# Patient Record
Sex: MALE | Race: WHITE
[De-identification: names, ages, dates, MRNs, and addresses within clinical notes are randomized per-mention and may not be internally consistent; named-entity substitution may affect disease eponyms.]

---

## 2017-01-12 NOTE — RS.[email or web address]
Date of Note: 01/12/17


Visit #: 1


Date of Evaluation: 01/12/17


Surgery Performed?: Yes


Procedure Performed: 


Lumbar decompression


Date of Procedure: 11/18/16


Treatment Diagnosis: Core weakness


History of Condition/Mechanism of Injury:: Patient has had LBP for many years.  

He started falling and then was the doctor who schedule him for surgery.  His 

wife is present and states they were told the surgeon put in 2 coflex 

interlaminar stabilization units. The lumbar levels of this procedure are 

unknown.


Prior Level of Function.....Patient was independent with: ADL's, Self Care, Work

/Vocation, Caregiving, Ambulation/Mobility, Community Integration/Access


Functional Limitations: Sleep (No BLTs.  Lift no greater than 14#), Pushing, 

Pulling, Lifting, Carrying, Standing, Bending, Squatting


Current Subjective/complaints:: Patient states he has had no pain since surgery 

except in the bilateral sciatic notches.  The L > R.  He only hurts when he 

stands and it onsets in 5 minutes or less.


*Precautions: No lifting > 14# and no twisting.





Medical History


Medical History: Hypertension, Arthritis


Surgical History: CABG, Other


Smoking Status: Current some day smoker





Pain Assessment





- Pain Description


Pain Location: Bilateral sciatic notches


Pain Description: Aching


Pain Description: Intermittent and just when standing.


Current Pain Intensity: 0/10


Worst Pain Intensity: 4/10





Functional Outcome Measure


Oswestry LBP: 14 (28%)





- G Codes & Severity Modifier


G Codes & Modifier: Mobliltiy:  Current - CJ.  Goal - CI


Source of G Code score: Oswestry Low Back Pain Scale





General


Muscle Strength: BLE strength is 4/5 grossly.





- ROM


Lumbar Flexion: Hand reach to Mid-Thighs


Sidebending to Left: Reach to Mid-thigh


Sidebending to Right: Reach to Mid-thigh


Lumbar Spine ROM Limitations: Soft Tissue Tightness, Pain





- Special Tests


SLR Test: Negative Left, Negative Right


Seated Dural Stretch Test: Negative Left, Negative Right





- Left Knee ROM


Left Knee Extension: -20 degrees


Knee ROM Limitations: Soft Tissue Tightness





- Right Knee ROM


Right Knee Extension: -20 degrees


Knee ROM Limitations: Soft Tissue Tightness





Palpation


Palpation Findings: None/Normal





Sensation





- Sensation


Sensation Description: Within Normal Limits





Interventions





- Exercise/Activities/Manual Therapy


Exercises/Activities: NA


Manual Therapy: NA





- Charges


Total Direct Minutes: 45


Total Treatment Time: 45


Procedures billed for this date of service:: PT [place] (Mod)





Assessment


Assessment: Patient had lumbar decompressive surgery 11/18/2016.  He is doing 

well and no longer using a brace.  He now has limited lumbar and BLE ROM, 

decreased core and BLE strength and decreased endurance. He has only 

intermittent pain in the sciatic notch regions bilaterally.


Patient Education: Education of diagnosis, Body/Joint mechanics, Home Safety, 

Activity Modification, Education of Plan of Care


Rehab Potential: Good





Short Term Goals


Goal #1: Patient is independent with basic HEP.


Goal to be met by: 01/27/17


Goal #2: BLE strength 4+/5 throughout to improve stability.


Goal to be met by: 01/27/17


Goal #3: Patient reports being able to stand/walk up to 10 minutes w/o pain.


Goal to be met by: 01/27/17





Long Term Goals


Goal #1: Patient is able to walk up to 30 minutes w/o pain.


Goal to be met by: 02/10/17


Goal #2: Lumbar ROM WFLs


Goal to be met by: 02/10/17


Goal #3: Eliminate pain with daily activities.


Goal to be met by: 02/10/17


Goal #4: Independent with HEP for DC.


Goal to be met by: 02/10/17





Plan





- Treatment to be Provided


Procedures: Therapeutic Exercises, Therapeutic Activity, Gait Training, 

Neuromuscular Rehab, Manual Therapy, Massage, Patient Education


Modalities: Electrical Stimulation, Ultrasound/Phonophoresis, Class IV Laser, 

Cryotherapy, Hot Packs





- Treatment Plan


Frequency: 3 X week


Duration: 4 weeks


ORDER # VISITS AND/OR THROUGH DATE: 02/10/2017





- Treatment Code


(1) Weakness of trunk musculature


Comments: 


M62.81








(2) Decreased ROM of lumbar spine


Code(s): M25.60


Comments: 


M25.60

## 2017-01-16 NOTE — RS.OPPTDN
Subjective


Date of Note: 01/16/17


Visit #: 2


Date of Evaluation: 01/12/17


Treatment Diagnosis: Core weakness


Current Subjective/complaints:: Patient says he has not had much sleep.  

Reports his pain has been elevated lately and only to the L low back.


*Precautions: No lifting > 14# and no twisting.





Pain Assessment





- Pain Description


Pain Location: Bilateral sciatic notches


Pain Description: Aching


Pain Description: Intermittent and just when standing.


Current Pain Intensity: "more today"





- Treatment


Modality: Electrical Stim Unattended


Parameters/Method Applied: Hivolt 2 large pads L lumbar paraspinals @ 135 pk 

volts x 20 mins


Patient Position: Supine





- Heat/Cryotherapy


Treatment: Hot Pack





Interventions





- Exercise/Activities/Manual Therapy


Exercises/Activities: Patient received passive stretching of SKTC, HS, 

Piriformis, Trunk rotation (short range), heel cords, and Fig 4.  All x 4 reps 

bilaterally.  Patient education of all therex and benefits as well as diagnosis.


Total minutes of Exercise: 13


Manual Therapy: NA





- Charges


Total Direct Minutes: 13


Total Treatment Time: 33


Procedures billed for this date of service:: hp, estim (un), ex


Assessment: Patient c/o L lumbar pain and demonstrates mod muscle guarding to 

the area as well as swelling to the L of the incision.  This feels like a hard 

knot that did not produce pain when palpated.  He demo tightness with all 

therex and should benefit from further stretching and general LE strengthening.


Patient Education: Education of diagnosis, Body/Joint mechanics, Home Exercise 

Program, Home Safety, Activity Modification, Education of Plan of Care





Short Term Goals


Goal #1: Patient is independent with basic HEP.


Goal to be met by: 01/27/17


Goal #2: BLE strength 4+/5 throughout to improve stability.


Goal to be met by: 01/27/17


Goal #3: Patient reports being able to stand/walk up to 10 minutes w/o pain.


Goal to be met by: 01/27/17





Long Term Goals


Goal #1: Patient is able to walk up to 30 minutes w/o pain.


Goal to be met by: 02/10/17


Goal #2: Lumbar ROM WFLs


Goal to be met by: 02/10/17


Goal #3: Eliminate pain with daily activities.


Goal to be met by: 02/10/17


Goal #4: Independent with HEP for DC.


Goal to be met by: 02/10/17





Plan


PLAN OF CARE EXPIRES ON:: 02/10/17


ORDER # VISITS AND/OR THROUGH DATE: 02/10/2017


PLAN: Progress Exercises

## 2017-01-20 NOTE — RS.OPPTDN
Subjective


Date of Note: 01/20/17


Visit #: 3


Date of Evaluation: 01/12/17


Treatment Diagnosis: Core weakness


Current Subjective/complaints:: Patient says he is feeling better after 

cancelling his last session from being sick.  States that he did not seem to 

have any problems with performing exercises.


*Precautions: No lifting > 14# and no twisting.





Pain Assessment





- Pain Description


Pain Location: Bilateral sciatic notches


Pain Description: Aching


Pain Description: Intermittent and just when standing.


Current Pain Intensity: "more today"





- Treatment


Modality: Electrical Stim Unattended


Parameters/Method Applied: hivolt 4 large pads @ 145 pk volts x 20 mins


Treatment Area: bilateral lumbar paraspinals near incision


Patient Position: Supine





- Heat/Cryotherapy


Treatment: Hot Pack





Interventions





- Exercise/Activities/Manual Therapy


Exercises/Activities: Patient received passive stretching of SKTC, HS, 

Piriformis, Trunk rotation (short range), heel cords, and Fig 4.  All x 4 reps 

bilaterally.  Patient begins trunk stability and general LE strengthening of QS

, Pillow squeezes, isometric hip flexion and abd, bridging all x 10.


Total minutes of Exercise: 22


Manual Therapy: NA





- Charges


Total Direct Minutes: 22


Total Treatment Time: 42


Procedures billed for this date of service:: hp, estim (un), ex


Assessment: Patient admitting lowered pain level to the back and only appeared 

to have some difficulty agusto L HS stretching (due to tightness).  He would 

benefit from further stretching and general strengthening to the LE's.


Patient Education: Education of diagnosis, Body/Joint mechanics, Home Exercise 

Program, Home Safety, Activity Modification, Education of Plan of Care





Short Term Goals


Goal #1: Patient is independent with basic HEP.


Goal to be met by: 01/27/17


Progress towards Goal:: Progressing


Goal #2: BLE strength 4+/5 throughout to improve stability.


Goal to be met by: 01/27/17


Goal #3: Patient reports being able to stand/walk up to 10 minutes w/o pain.


Goal to be met by: 01/27/17





Long Term Goals


Goal #1: Patient is able to walk up to 30 minutes w/o pain.


Goal to be met by: 02/10/17


Goal #2: Lumbar ROM WFLs


Goal to be met by: 02/10/17


Goal #3: Eliminate pain with daily activities.


Goal to be met by: 02/10/17


Goal #4: Independent with HEP for DC.


Goal to be met by: 02/10/17





Plan


PLAN OF CARE EXPIRES ON:: 02/10/17


ORDER # VISITS AND/OR THROUGH DATE: 02/10/2017


PLAN: Continue Plan of Care (and progress exercises)

## 2017-01-25 NOTE — RS.OPPTDN
Subjective


Date of Note: 01/25/17


Visit #: 5


Date of Evaluation: 01/12/17


Treatment Diagnosis: Core weakness


Current Subjective/complaints:: Patient states that he is not hurting today.  

Reports exercises seem to be helping him.


*Precautions: No lifting > 14# and no twisting.





Pain Assessment





- Pain Description


Pain Location: Bilateral sciatic notches


Pain Description: Aching


Pain Description: Intermittent and just when standing.


Current Pain Intensity: "less than last time."





- Heat/Cryotherapy


Treatment: Hot Pack (15 mins to the mid to low back in supine)





Interventions





- Exercise/Activities/Manual Therapy


Exercises/Activities: Patient received passive stretching of SKTC, HS, 

Piriformis,  heel cords, and Fig 4.  All x 4 reps bilaterally.  Patient 

continues with trunk stability and general LE strengthening of QS, Pillow 

squeezes, isometric hip flexion and abd, bridging, SLR, alternate UE lift with 3

# bar and 2# on LE's all increased to 2 x 10.  Sitting:  Scap retraction with 

red tband x 10.  Gave patient SKTC, bridging, SLR, alternate LE lift and pillow 

squeezes for home.  Printed new copies.


Total minutes of Exercise: 30


Manual Therapy: NA





- Charges


Total Direct Minutes: 30


Total Treatment Time: 50


Procedures billed for this date of service:: hp, ex2


Assessment: Patient shows improved flexibility to LEs today with HS and heel 

cord stretching.  He does well with agusto all progressing trunk stability.


Patient Education: Education of diagnosis, Body/Joint mechanics, Home Exercise 

Program, Home Safety, Activity Modification, Education of Plan of Care


Patient demonstrates compliance with HEP?: Yes





Short Term Goals


Goal #1: Patient is independent with basic HEP.


Goal to be met by: 01/27/17


Progress towards Goal:: Progressing


Goal #2: BLE strength 4+/5 throughout to improve stability.


Goal to be met by: 01/27/17


Progress towards Goal:: Progressing


Goal #3: Patient reports being able to stand/walk up to 10 minutes w/o pain.


Goal to be met by: 01/27/17


Progress towards Goal:: Progressing





Long Term Goals


Goal #1: Patient is able to walk up to 30 minutes w/o pain.


Goal to be met by: 02/10/17


Goal #2: Lumbar ROM WFLs


Goal to be met by: 02/10/17


Progress towards goal: Progressing


Goal #3: Eliminate pain with daily activities.


Goal to be met by: 02/10/17


Progress towards goal: Progressing


Goal #4: Independent with HEP for DC.


Goal to be met by: 02/10/17





Plan


PLAN OF CARE EXPIRES ON:: 02/10/17


ORDER # VISITS AND/OR THROUGH DATE: 02/10/2017


PLAN: Progress Exercises

## 2017-01-27 NOTE — RS.OPPTDN
Subjective


Date of Note: 01/27/17


Visit #: 6


Date of Evaluation: 01/12/17


Treatment Diagnosis: Core weakness


Current Subjective/complaints:: Patient states he was leaned over for a while 

changing his car battery yesterday and has had soreness since across the low 

back and c/o L hip pain.  He says he thought he should be able to do this well 

and without any problems.


*Precautions: No lifting > 14# and no twisting.





Pain Assessment





- Pain Description


Pain Location: Bilateral sciatic notches


Pain Description: Aching


Pain Description: Intermittent and just when standing.


Current Pain Intensity: "less than last time."





Interventions





- Exercise/Activities/Manual Therapy


Exercises/Activities: Patient received passive stretching of SKTC, HS, 

Piriformis,  heel cords, and Fig 4.  All x 4 reps bilaterally.  Patient 

continues with trunk stability and general LE strengthening of QS, Pillow 

squeezes, isometric hip flexion and abd, bridging, SLR,   Sitting:  Scap 

retraction with red tband x 10.  SAQ 2# each leg, QS, DF with green tband.  All 

2x 10 reps.


Total minutes of Exercise: 30


Manual Therapy: NA


HOME EXERCISE PROGRAM: SKTC, Pillow squeezes, bridging, alternate LE lift.  

Patient asks about using OTC tens unit for his back.  Patient was encouraged to 

use it and instructed where to place it.





- Charges


Total Direct Minutes: 30


Total Treatment Time: 50


Procedures billed for this date of service:: ex2, hp


Assessment: Patient has had increased pain to the L hip and low back near the 

waistline.  He was leaning over changing his car battery and did not anticipate 

it bothering him as much as it had.


Patient Education: Education of diagnosis, Body/Joint mechanics, Home Exercise 

Program, Home Safety, Activity Modification, Education of Plan of Care


Patient demonstrates compliance with HEP?: Yes





Short Term Goals


Goal #1: Patient is independent with basic HEP.


Goal to be met by: 01/27/17


Progress towards Goal:: Progressing


Goal #2: BLE strength 4+/5 throughout to improve stability.


Goal to be met by: 01/27/17


Progress towards Goal:: Progressing


Goal #3: Patient reports being able to stand/walk up to 10 minutes w/o pain.


Goal to be met by: 01/27/17


Progress towards Goal:: Progressing





Long Term Goals


Goal #1: Patient is able to walk up to 30 minutes w/o pain.


Goal to be met by: 02/10/17


Goal #2: Lumbar ROM WFLs


Goal to be met by: 02/10/17


Progress towards goal: Progressing


Goal #3: Eliminate pain with daily activities.


Goal to be met by: 02/10/17


Progress towards goal: Progressing


Goal #4: Independent with HEP for DC.


Goal to be met by: 02/10/17





Plan


PLAN OF CARE EXPIRES ON:: 02/10/17


ORDER # VISITS AND/OR THROUGH DATE: 02/10/2017


PLAN: Progress Exercises

## 2017-01-31 ENCOUNTER — HOSPITAL ENCOUNTER (OUTPATIENT)
Dept: HOSPITAL 58 - REHAB | Age: 72
End: 2017-01-31
Attending: ORTHOPAEDIC SURGERY

## 2017-01-31 DIAGNOSIS — M62.81: ICD-10-CM

## 2017-01-31 DIAGNOSIS — M54.5: Primary | ICD-10-CM

## 2017-01-31 DIAGNOSIS — M25.60: ICD-10-CM

## 2017-01-31 NOTE — RS.OPPTDN
Subjective


Date of Note: 01/31/17


Visit #: 8


Date of Evaluation: 01/12/17


Treatment Diagnosis: Core weakness


Current Subjective/complaints:: Patient admits he is doing well.  He says he 

has not had much pain.  He says soreness is mostly L side to his hip.  States 

no problems agusto exercises and feels he has more mobility.


*Precautions: No lifting > 14# and no twisting.





Pain Assessment





- Pain Description


Pain Location: Bilateral sciatic notches


Pain Description: Aching


Pain Description: Intermittent and just when standing.


Current Pain Intensity: "less than last time."





Interventions





- Exercise/Activities/Manual Therapy


Exercises/Activities: Patient received passive stretching of SKTC, HS, 

Piriformis,  heel cords, and Fig 4.  All x 3 reps bilaterally.  Patient 

continues with trunk stability and general LE strengthening of QS, Ball squeezes

, isometric hip flexion and abd, Hooklying hip abd with green tband, bridging, 

SLR, alternate LE raise with 2# therapy stick for UE, SAQ 2#, ham curls/DF with 

green tband all 2/10.   Sitting:  Scap retraction progressed to green tband x 

15.  SAQ 3# each leg, QS, DF with green tband.  All 2x 10 reps.  Began 

treadmill @ 1.1 mph x 7 mins steadily.


Total minutes of Exercise: 38


Manual Therapy: NA


HOME EXERCISE PROGRAM: SKTC, Pillow squeezes, bridging, alternate LE lift.  

Patient asks about using OTC tens unit for his back.  Patient was encouraged to 

use it and instructed where to place it.





- Charges


Total Direct Minutes: 38


Total Treatment Time: 38


Procedures billed for this date of service:: ex3


Assessment: Patient with less soreness to the LB and demo improved ability to 

perform progressive therex.  He is working well with treadmill up to 7 mins 

today demo good balance.


Patient Education: Education of diagnosis, Body/Joint mechanics, Home Exercise 

Program, Home Safety, Activity Modification, Education of Plan of Care


Patient demonstrates compliance with HEP?: Yes





Short Term Goals


Goal #1: Patient is independent with basic HEP.


Goal to be met by: 01/27/17


Progress towards Goal:: Met


Goal #2: BLE strength 4+/5 throughout to improve stability.


Goal to be met by: 01/27/17


Progress towards Goal:: Met


Goal #3: Patient reports being able to stand/walk up to 10 minutes w/o pain.


Goal to be met by: 01/27/17


Progress towards Goal:: Progressing





Long Term Goals


Goal #1: Patient is able to walk up to 30 minutes w/o pain.


Goal to be met by: 02/10/17


Goal #2: Lumbar ROM WFLs


Goal to be met by: 02/10/17


Progress towards goal: Progressing


Goal #3: Eliminate pain with daily activities.


Goal to be met by: 02/10/17


Progress towards goal: Progressing


Goal #4: Independent with HEP for DC.


Goal to be met by: 02/10/17





Plan


PLAN OF CARE EXPIRES ON:: 02/10/17


ORDER # VISITS AND/OR THROUGH DATE: 02/10/2017


PLAN: Progress Exercises

## 2017-02-03 ENCOUNTER — HOSPITAL ENCOUNTER (OUTPATIENT)
Dept: HOSPITAL 58 - REHAB | Age: 72
LOS: 25 days | End: 2017-02-28
Attending: ORTHOPAEDIC SURGERY

## 2017-02-03 DIAGNOSIS — Z98.890: ICD-10-CM

## 2017-02-03 DIAGNOSIS — M54.5: Primary | ICD-10-CM

## 2017-02-03 NOTE — RS.OPPTDN
Subjective


Date of Note: 02/03/17


Visit #: 9


Date of Evaluation: 01/12/17


Treatment Diagnosis: Core weakness


Current Subjective/complaints:: Patient states he changed another battery for 

his daughter yesterday and says that his back was hurt again.  Reports that he 

had tightness/soreness to the L calf.  Reports he was leaning over and on balls 

of feet.  He says he used Biofreeze on his leg and it had relieved his pain.


*Precautions: No lifting > 14# and no twisting.





Pain Assessment





- Pain Description


Pain Location: L calf and waistline


Pain Description: elevated due to poor positioning changing battery





- Heat/Cryotherapy


Treatment: Hot Pack (mid to low back in supine x 15 mins)





Interventions





- Exercise/Activities/Manual Therapy


Exercises/Activities: Patient received passive stretching of SKTC, HS, 

Piriformis,  heel cords, and Fig 4.  All x 3 reps bilaterally.  Patient 

continues with trunk stability and general LE strengthening of QS, Ball squeezes

, isometric hip flexion and abd, Hooklying hip abd with green tband, bridging, 

SLR, alternate LE raise with 2# therapy stick for UE, SAQ 2 1/2#, ham curls/DF 

with green tband all 2/10.   Sitting:  Scap retraction progressed to green 

tband x 15.  SAQ 3# each leg, QS, DF with green tband.  All 2x 10 reps.  No 

treadmill today per patient admitting he will leave here and grocery shop at 

[facility] and will do plenty of walking.


Total minutes of Exercise: 35


Manual Therapy: NA


HOME EXERCISE PROGRAM: SKTC, Pillow squeezes, bridging, alternate LE lift.  

Patient asks about using OTC tens unit for his back.  Patient was encouraged to 

use it and instructed where to place it.





- Charges


Total Direct Minutes: 35


Total Treatment Time: 50


Procedures billed for this date of service:: hp, ex2


Assessment: Patient had elevated pain and soreness to the L calf.  He had full 

relief with moist heat, Biofreeze (at home yesterday), and stretching today.  

Recent increase related to poor mechanics changing another battery.


Patient Education: Education of diagnosis, Body/Joint mechanics, Home Exercise 

Program, Home Safety, Activity Modification, Education of Plan of Care


Patient demonstrates compliance with HEP?: Yes





Short Term Goals


Goal #1: Patient is independent with basic HEP.


Goal to be met by: 01/27/17


Progress towards Goal:: Met


Goal #2: BLE strength 4+/5 throughout to improve stability.


Goal to be met by: 01/27/17


Progress towards Goal:: Met


Goal #3: Patient reports being able to stand/walk up to 10 minutes w/o pain.


Goal to be met by: 01/27/17


Progress towards Goal:: Progressing





Long Term Goals


Goal #1: Patient is able to walk up to 30 minutes w/o pain.


Goal to be met by: 02/10/17


Goal #2: Lumbar ROM WFLs


Goal to be met by: 02/10/17


Progress towards goal: Progressing


Goal #3: Eliminate pain with daily activities.


Goal to be met by: 02/10/17


Progress towards goal: Progressing


Goal #4: Independent with HEP for DC.


Goal to be met by: 02/10/17





Plan


PLAN OF CARE EXPIRES ON:: 02/10/17


ORDER # VISITS AND/OR THROUGH DATE: 02/10/2017


PLAN: Plan for Discharge (Attend one more session next week)

## 2017-08-16 NOTE — RS.[email or web address]
Date of Note: 08/15/17


Visit #: 1


Date of Evaluation: 08/15/17


Payer Source: MEDICARE


Surgery Performed?: Yes


Treatment Diagnosis: Left low back/hip pain


History of Condition/Mechanism of Injury:: Patient reports having surgery to 

his low back in November 2016.  States he feels like he did well after surgery. 

He attended Outpatient Physical Therapy following his surgery.  States this 

left sided low back/LE pain started at least two months ago, without any known 

injury.


Prior Level of Function.....Patient was independent with: ADL's, Self Care, Work

/Vocation, Caregiving, Ambulation/Mobility, Community Integration/Access


Functional Limitations: Sleep, Pushing, Pulling, Lifting, Carrying, Standing, 

Bending, Squatting


Current Subjective/complaints:: Patient reports pain is in left buttock area, 

hip, and down to the knee.  States pain is better than it was when it first 

started.  States his pain is worse in standing.  He is still having difficulty 

sleeping, but he is sleeping better than when his symptoms for started.  He has 

pain medication to take, but states he only takes it when his pain is severe.  

Last pain medication taken was two nights ago.  Reports he has noticed some 

weakness in the left LE. Reports currently having Bell's Palsy symptoms from 

having a lump removed from his neck last month.  Patient reports increased left 

LE pain following evaluation.





Medical History


Medical History: Hypertension, Arthritis


Surgical History: CABG (2004), Other (lump removed from neck 7/3/17)


Surgical History Comments:: Lumbar spine surgery involving 2 coflex 

interlaminar stabilization units, 11/18/16


Smoking Status: Current every day smoker


Hx Home Medications: Norco PRN, Toprol XL, Lipitor, Zestril


Patient's Goals: His goal is to get relief of left LE and back pain.





Pain Assessment





- Pain Description


Pain Location: Pain in left low back and LE.


Pain Description: Radiating, Aching


Current Pain Intensity: 5/10


Worst Pain Intensity: not quantified





Functional Outcome Measure


Oswestry LBP: 66





- G Codes & Severity Modifier


G Codes & Modifier: Mob current CL.  Mob goal CJ


Source of G Code score: Oswestry LBP scale





Observation





- Observation


Posture: Forward Head, Rounded Shoulders, Decreased Lumbar Lordosis





Gait





- Gait Pattern


Gait Comments: Ambulates without an assistive device.  No obvious gait 

deviations noted.





- ROM


Lumbar Flexion: Hand reach to patellae


Sidebending to Left: Reach to Mid-thigh


Sidebending to Right: Reach to Mid-thigh


Lumbar Spine ROM Limitations: Pain


Comments: Reports lumbar extension past neutral reproduces left LE symptoms.





- Strength


Comments: Gross trunk muscle strength 4/5.  Left LE strength: hip 4/5, knee 4+/5

, ankle 4 to 4+/5.  Right LE strength grossly 4 to 4+/5.





- Special Tests


THO Test: Negative Left, Negative Right


SLR Test: Negative Left, Negative Right


Seated Dural Stretch Test: Negative Left, Negative Right


SI Joint Compression: Negative


SI Joint Distraction: Negative





Palpation


Comments:: Patient reports some tenderness along the left lumbosacral region.  

Demonstrates moderate increased muscle tone along bilateral lumbar paraspinals.

  Reports no tenderness with palpation to the gluteal musculature of the left 

buttock region.





Sensation





- Sensation


Comments: 


Reports sensation is intact.





Additional Comments:


Additional Comments: SLR in supine, right to 45 degrees, left to 35-40 degrees.





- Treatment


Modality: Electrical Stim Unattended


Parameters/Method Applied: 2 large pads to left lumbosacral region X 10 mins @ 

40 peak volts HVGS


Patient Position: Sitting





- Heat/Cryotherapy


Treatment: Hot Pack (with Estim to low back)





Interventions





- Exercise/Activities/Manual Therapy


Exercises/Activities: Patient instructed in gentle HS stretch for home.


Manual Therapy: NA


HOME EXERCISE PROGRAM: HS stretch





- Charges


Total Direct Minutes: 55 mins


Total Treatment Time: 45 mins 


Procedures billed for this date of service:: Phuc Lua





Assessment


Assessment: Patient presents to therapy with a diagnosis of sciatica of left 

side.  He reports left sided low back pain with left LE pain for at least two 

months, without any known injury.  Reports limited tolerance with standing.  He 

demonstrates general weakness in the left LE and trunk.  Pain is only 

reproduced with lumbar extension.  Demonstrates no reproduction of symptoms 

with Special Tests.  Patient will benefit from modalities and stretching to 

reduce muscle tone and pain, and progressed exercises to improve LE and trunk 

strength.


Patient Education: Education of diagnosis, Body/Joint mechanics, Home Exercise 

Program, Education of Plan of Care


Rehab Potential: Good





Short Term Goals


Goal #1: Patient is independent with basic HEP.


Goal to be met by: 08/30/17


Goal #2: BLE strength 4+/5 throughout.


Goal to be met by: 08/30/17


Goal #3: Patient reports being able to stand up to 10 minutes with minmal pain.


Goal to be met by: 08/30/17





Long Term Goals


Goal #1: Pt knows HEP and to continue ex's to maintain functional ability.


Goal to be met by: 09/30/17


Goal #2: Score on Oswestry LBP scale improved to 40.


Goal to be met by: 09/30/17


Goal #3: Pt able to stand as needed with minimal to no back/LLE pain.


Goal to be met by: 09/30/17


Goal #4: Patient able to perform all daily activities with minimal to no pain.


Goal to be met by: 09/30/17





Plan





- Treatment to be Provided


Procedures: Therapeutic Exercises, Therapeutic Activity, Gait Training, 

Neuromuscular Rehab, Manual Therapy, Massage, Patient Education


Modalities: Electrical Stimulation, Ultrasound/Phonophoresis, Class IV Laser, 

Cryotherapy, Hot Packs





- Treatment Plan


Frequency: 3 X week


Duration: 4 weeks


ORDER # VISITS AND/OR THROUGH DATE: 9/30/17





- Treatment Code


(1) Leg pain


Qualifiers: 


     Laterality: left     Qualified Description: Pain of left lower extremity  

     Qualifier Code(s): (M79.605) Pain in left leg  





(2) Weakness of trunk musculature


Comments: 


M62.81








(3) Left sided sciatica


Comments: 


M54.32

## 2017-08-21 NOTE — RS.OPPTDN
Subjective


Date of Note: 08/21/17


Visit #: 2


Date of Evaluation: 08/15/17


Payer Source: MEDICARE


Treatment Diagnosis: Left low back/hip pain


Current Subjective/complaints:: Patient reports moderate pain today ,has taken 

pain meds. before PT appt.He reports standing causes him the most pain.


*Precautions: No lifting > 14# and no twisting.





Pain Assessment





- Pain Description


Pain Location: Pain in left low back and LE.


Pain Description: Radiating, Aching


Current Pain Intensity: 5/10


Other Comments regarding Pain:: Radiating pain is down to the L knee today.





- Treatment


Modality: Electrical Stim Unattended (Requests no hot pack today.)


Parameters/Method Applied: 20 mins. high volt to L hip and lumbar area,2 large 

electrodes @ 125 pv.


Patient Position: Right Sidelying (Requests no hot pack today.)





Interventions





- Exercise/Activities/Manual Therapy


Exercises/Activities: 25 mins. of hamstring stretches with towel assist if 

necessary,SKTC in pain free ROM,body mechanics.


Total minutes of Exercise: 25


Manual Therapy: NA


Total minutes of Manual Therapy: 0


HOME EXERCISE PROGRAM: HS stretch





- Charges


Total Direct Minutes: 25


Total Treatment Time: 45


Procedures billed for this date of service:: e-stim,ex 2


Assessment: Patient reports he can tolerate being supine for short time today 

with less pain and sciatica , and this improves in hooklying position.He has 

moderate hamstring tightness in the L LE.He is attentive to HEP recommendations.


Patient Education: Education of diagnosis, Body/Joint mechanics, Home Exercise 

Program, Home Safety, Activity Modification, Education of Plan of Care


Patient demonstrates compliance with HEP?: Yes





Short Term Goals


Goal #1: Patient is independent with basic HEP.


Goal to be met by: 08/30/17


Progress towards Goal:: Progressing


Goal #2: BLE strength 4+/5 throughout.


Goal to be met by: 08/30/17


Goal #3: Patient reports being able to stand up to 10 minutes with minimal pain.


Goal to be met by: 08/30/17





Long Term Goals


Goal #1: Pt knows HEP and to continue ex's to maintain functional ability.


Goal to be met by: 09/30/17


Progress towards goal: Progressing


Goal #2: Score on Oswestry LBP scale improved to 40.


Goal to be met by: 09/30/17


Goal #3: Pt able to stand as needed with minimal to no back/LLE pain.


Goal to be met by: 09/30/17


Goal #4: Patient able to perform all daily activities with minimal to no pain.


Goal to be met by: 09/30/17





Plan


PLAN OF CARE EXPIRES ON:: 09/30/17


ORDER # VISITS AND/OR THROUGH DATE: 9/30/17


PLAN: Continue Plan of Care

## 2017-08-28 NOTE — RS.OPPTDN
Subjective


Date of Note: 08/28/17


Visit #: 3


Date of Evaluation: 08/15/17


Payer Source: MEDICARE


Treatment Diagnosis: Left low back/hip pain


Current Subjective/complaints:: Patient reports no pain at rest today,but it 

returns as he walks and increases the longer he walks.


*Precautions: No lifting > 14# and no twisting.





Pain Assessment





- Pain Description


Pain Location: Pain in left low back and LE.


Pain Description: Radiating, Aching


Current Pain Intensity: 5/10





- Treatment


Modality: Electrical Stim Unattended


Parameters/Method Applied: 20 mins. high volt,2 large electrodes to L lumbar 

and L gluteal region @ 140 pv,


Patient Position: Right Sidelying





Interventions





- Exercise/Activities/Manual Therapy


Exercises/Activities: 20 mins. of hamstring stretches ,SKTC ,body mechanics 

review.


Manual Therapy: NA


HOME EXERCISE PROGRAM: HS stretch





- Charges


Total Direct Minutes: 20


Total Treatment Time: 40


Procedures billed for this date of service:: e-stim,ex 1


Assessment: Patient initially has moderate hamstring tightness L > R ,but this 

improves as stretches progress today.He tolerates the SKTC stretches with no 

increased back pain .He does report slight L hip pain as the exercises continue 

while doing hamstring stretches.


Patient Education: Body/Joint mechanics, Home Exercise Program, Activity 

Modification, Education of Plan of Care





Short Term Goals


Goal #1: Patient is independent with basic HEP.


Goal to be met by: 08/30/17


Progress towards Goal:: Progressing


Goal #2: BLE strength 4+/5 throughout.


Goal to be met by: 08/30/17


Goal #3: Patient reports being able to stand up to 10 minutes with minimal pain.


Goal to be met by: 08/30/17


Progress towards Goal:: No Change





Long Term Goals


Goal #1: Pt knows HEP and to continue ex's to maintain functional ability.


Goal to be met by: 09/30/17


Progress towards goal: Progressing


Goal #2: Score on Oswestry LBP scale improved to 40.


Goal to be met by: 09/30/17


Goal #3: Pt able to stand as needed with minimal to no back/LLE pain.


Goal to be met by: 09/30/17


Goal #4: Patient able to perform all daily activities with minimal to no pain.


Goal to be met by: 09/30/17





Plan


PLAN OF CARE EXPIRES ON:: 09/30/17


ORDER # VISITS AND/OR THROUGH DATE: 9/30/17


PLAN: Continue Plan of Care

## 2017-08-30 ENCOUNTER — HOSPITAL ENCOUNTER (OUTPATIENT)
Dept: HOSPITAL 58 - REHAB | Age: 72
LOS: 1 days | End: 2017-08-31
Attending: FAMILY MEDICINE

## 2017-08-30 DIAGNOSIS — M54.32: Primary | ICD-10-CM

## 2017-08-30 NOTE — RS.OPPTDN
Subjective


Date of Note: 08/30/17


Visit #: 4


Date of Evaluation: 08/15/17


Payer Source: MEDICARE


Treatment Diagnosis: Left low back/hip pain


Current Subjective/complaints:: Patient says he hurt his back worse this 

morning.  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  He 

says he landed on the L side of his back and hip.


*Precautions: No lifting > 14# and no twisting.





Pain Assessment





- Pain Description


Pain Location: Pain in left low back and LE.


Pain Description: Radiating, Aching


Current Pain Intensity: Increased





- Treatment


Modality: Electrical Stim Unattended


Parameters/Method Applied: hivolt 4 large pads controlled seperately from L to 

R @ 160-175 pk volts x 20 mins


Treatment Area: lumbar paraspinals and into SI joints


Patient Position: Right Sidelying





Interventions





- Exercise/Activities/Manual Therapy


Exercises/Activities: Patient receives passive stretching to the L: SKTC, HS, 

heel cord stretching, Piriformis, fig 4, and lower trunk rotation to the R.  

Patient remains very tight and only minimal stretch required to obtain results.

  Patient instructed in supine and standing gastroc stretching for home per 

request.


Total minutes of Exercise: 20


Manual Therapy: NA


HOME EXERCISE PROGRAM: HS stretch





- Charges


Total Direct Minutes: 20


Total Treatment Time: 40


Procedures billed for this date of service:: hp, estim (un), ex


Assessment: Patient with recent reports of pain related to fall.  Patient lands 

on the L back and hip.  He presents with elevated pain, but at end of session, 

he admits improved pain and tightness to the L LB.  He also shows improved gait 

as he is placing more WB on the L LE compared to arrival.


Patient Education: Education of diagnosis, Body/Joint mechanics, Home Exercise 

Program, Home Safety, Activity Modification, Education of Plan of Care


Patient demonstrates compliance with HEP?: Yes





Short Term Goals


Goal #1: Patient is independent with basic HEP.


Goal to be met by: 08/30/17


Progress towards Goal:: Progressing


Goal #2: BLE strength 4+/5 throughout.


Goal to be met by: 08/30/17


Goal #3: Patient reports being able to stand up to 10 minutes with minimal pain.


Goal to be met by: 08/30/17


Progress towards Goal:: No Change





Long Term Goals


Goal #1: Pt knows HEP and to continue ex's to maintain functional ability.


Goal to be met by: 09/30/17


Progress towards goal: Progressing


Goal #2: Score on Oswestry LBP scale improved to 40.


Goal to be met by: 09/30/17


Goal #3: Pt able to stand as needed with minimal to no back/LLE pain.


Goal to be met by: 09/30/17


Goal #4: Patient able to perform all daily activities with minimal to no pain.


Goal to be met by: 09/30/17





Plan


PLAN OF CARE EXPIRES ON:: 09/30/17


ORDER # VISITS AND/OR THROUGH DATE: 9/30/17


PLAN: Progress Exercises (continue modalities for pain relief and muscle 

tightness along with progressive stretching leading to stability exercises.)

## 2017-09-05 NOTE — RS.OPPTDN
Subjective


Date of Note: 09/05/17


Visit #: 5


Date of Evaluation: 08/15/17


Payer Source: MEDICARE


Treatment Diagnosis: Left low back/hip pain


Current Subjective/complaints:: Reports the back feels better than last week,

has recovered from being knocked down by one of his cows.He requests not to use 

the hot pack today.


*Precautions: No lifting > 14# and no twisting.





Pain Assessment





- Pain Description


Pain Location: Pain in left low back and LE.


Pain Description: Radiating, Aching


Pain Description: radiates into L gluteals


Current Pain Intensity: 2-3/10





- Treatment


Modality: Electrical Stim Unattended


Parameters/Method Applied: 20 mis. high volt to L lumbar and L hip ,2 large 

electrodes @ 175 pv.


Patient Position: Right Sidelying





Interventions





- Exercise/Activities/Manual Therapy


Exercises/Activities: 25 mins. total of SKTC,DKTC,piriformis stretches.90 / 90 

hamstring stretches for L and R today,ended with body mechanics education.


Total minutes of Exercise: 25


Manual Therapy: NA


Total minutes of Manual Therapy: 0


HOME EXERCISE PROGRAM: SKTC,hamstring stretches,gentle piriformis stretches.





- Charges


Total Direct Minutes: 25


Total Treatment Time: 45


Procedures billed for this date of service:: e-stim,ex 2


Assessment: Patient reports relief after PT sessions.he is able to tolerate 

standing position for increased duration with less pain present the past few 

days.He is attentive and motivated to improve.His hamstrings have moderate 

tightness bilaterally,which lessens after the stretching.


Patient Education: Education of diagnosis, Body/Joint mechanics, Home Exercise 

Program, Home Safety, Activity Modification, Education of Plan of Care


Patient demonstrates compliance with HEP?: Yes





Short Term Goals


Goal #1: Patient is independent with basic HEP.


Goal to be met by: 08/30/17


Progress towards Goal:: Progressing


Goal #2: BLE strength 4+/5 throughout.


Goal to be met by: 08/30/17


Goal #3: Patient reports being able to stand up to 10 minutes with minimal pain.


Goal to be met by: 08/30/17


Progress towards Goal:: Progressing





Long Term Goals


Goal #1: Pt knows HEP and to continue ex's to maintain functional ability.


Goal to be met by: 09/30/17


Progress towards goal: Progressing


Goal #2: Score on Oswestry LBP scale improved to 40.


Goal to be met by: 09/30/17


Goal #3: Pt able to stand as needed with minimal to no back/LLE pain.


Goal to be met by: 09/30/17


Goal #4: Patient able to perform all daily activities with minimal to no pain.


Goal to be met by: 09/30/17


Progress towards goal: Progressing





Plan


PLAN OF CARE EXPIRES ON:: 09/30/17


ORDER # VISITS AND/OR THROUGH DATE: 9/30/17


PLAN: Continue Plan of Care

## 2017-09-11 ENCOUNTER — HOSPITAL ENCOUNTER (OUTPATIENT)
Dept: HOSPITAL 58 - REHAB | Age: 72
LOS: 19 days | End: 2017-09-30
Attending: FAMILY MEDICINE

## 2017-09-11 DIAGNOSIS — M54.32: Primary | ICD-10-CM

## 2017-09-11 NOTE — RS.OPPTDN
Subjective


Date of Note: 09/11/17


Visit #: 6


Date of Evaluation: 08/15/17


Payer Source: MEDICARE


Treatment Diagnosis: Left low back/hip pain


Current Subjective/complaints:: Patient reports he is doing his exercises as 

time allows,feels the therapy is helping.He currently has no pain.


*Precautions: No lifting > 14# and no twisting.





Pain Assessment





- Pain Description


Pain Location: L lumbar/gluteals


Pain Description: Dull, Aching


Current Pain Intensity: 0 at rest





- Treatment


Modality: Electrical Stim Unattended


Parameters/Method Applied: 20 mins. high volt tolumbar,2 large electrodes,@ 205 

pv.


Patient Position: Right Sidelying





Interventions





- Exercise/Activities/Manual Therapy


Exercises/Activities: 25 mins. total of SKTC,DKTC,piriformis stretches.90 / 90 

hamstring stretches .Added postural pullbacks with yellow theraband at 

different heights .


Total minutes of Exercise: 25


Manual Therapy: NA


Total minutes of Manual Therapy: 0


HOME EXERCISE PROGRAM: SKTC,hamstring stretches,gentle piriformis 

stretches.Yellow theraband postural pullbacks.





- Charges


Total Direct Minutes: 25


Total Treatment Time: 45


Procedures billed for this date of service:: e-stim,ex 2


Assessment: Patient is progressing.He reports tolerating ADl's with less 

intense pain ,and also the pain is less frequent.He does reports static stance 

increases his pain ,usually within 2 - 3 minutes.He gets relief from this pain 

with walking or any change of position.He has improved posture awareness,

motivated and attentive.


Patient Education: Education of diagnosis, Body/Joint mechanics, Home Exercise 

Program, Home Safety, Activity Modification, Education of Plan of Care


Patient demonstrates compliance with HEP?: Yes





Short Term Goals


Goal #1: Patient is independent with basic HEP.


Goal to be met by: 08/30/17


Progress towards Goal:: Progressing


Goal #2: BLE strength 4+/5 throughout.


Goal to be met by: 08/30/17


Progress towards Goal:: Progressing


Goal #3: Patient reports being able to stand up to 10 minutes with minimal pain.


Goal to be met by: 08/30/17 (40%)


Progress towards Goal:: Progressing





Long Term Goals


Goal #1: Pt knows HEP and to continue ex's to maintain functional ability.


Goal to be met by: 09/30/17


Progress towards goal: Progressing


Goal #2: Score on Oswestry LBP scale improved to 40.


Goal to be met by: 09/30/17


Goal #3: Pt able to stand as needed with minimal to no back/LLE pain.


Goal to be met by: 09/30/17


Goal #4: Patient able to perform all daily activities with minimal to no pain.


Goal to be met by: 09/30/17


Progress towards goal: Progressing





Plan


PLAN OF CARE EXPIRES ON:: 09/30/17


ORDER # VISITS AND/OR THROUGH DATE: 9/30/17


PLAN: Progress Exercises

## 2017-09-13 NOTE — RS.CXNS
Date of scheduled appointment: 09/13/17


Type: No Show (Unknown,but possibly due to work schedule.)

## 2018-09-03 ENCOUNTER — HOSPITAL ENCOUNTER (OUTPATIENT)
Dept: HOSPITAL 58 - AMBL | Age: 73
Discharge: HOME | End: 2018-09-03
Attending: INTERNAL MEDICINE
Payer: COMMERCIAL

## 2018-09-03 DIAGNOSIS — R53.1: ICD-10-CM

## 2018-09-03 DIAGNOSIS — R40.20: Primary | ICD-10-CM

## 2018-09-03 DIAGNOSIS — R41.0: ICD-10-CM

## 2018-09-03 DIAGNOSIS — R11.0: ICD-10-CM

## 2018-09-03 DIAGNOSIS — I95.9: ICD-10-CM

## 2018-09-03 DIAGNOSIS — R00.1: ICD-10-CM

## 2018-09-03 DIAGNOSIS — R51: ICD-10-CM

## 2021-01-13 NOTE — RS.OPPTDN
Pt alert and orientedx4. 4 liter of oxygen, nasal cannula, states she takes 4 liters PRN at night at home. ASHLEY but refuses CPAP, just states the nasal cannula works. Pulse ox. Lung sounds diminished. Hx. Of fall back in July 2020. No order for tele.  On hep Subjective


Date of Note: 01/30/17


Visit #: 7


Date of Evaluation: 01/12/17


Treatment Diagnosis: Core weakness


Current Subjective/complaints:: Patient says he did not have any soreness after 

his last session with new therex.  He says he is doing good today.  Asks how 

much longer he needs to come.


*Precautions: No lifting > 14# and no twisting.





Pain Assessment





- Pain Description


Pain Location: Bilateral sciatic notches


Pain Description: Aching


Pain Description: Intermittent and just when standing.


Current Pain Intensity: "less than last time."





- Heat/Cryotherapy


Treatment: Hot Pack (15 mins to mid to low back in supine)





Interventions





- Exercise/Activities/Manual Therapy


Exercises/Activities: Patient received passive stretching of SKTC, HS, 

Piriformis,  heel cords, and Fig 4.  All x 3 reps bilaterally.  Patient 

continues with trunk stability and general LE strengthening of QS, Pillow 

squeezes, isometric hip flexion and abd, bridging, SLR, alternate LE raise with 

2# therapy stick for UE, SAQ 2#, ham curls/DF with green tband all 2/10.   

Sitting:  Scap retraction progressed to green tband x 15.  SAQ 2# each leg, QS, 

DF with green tband.  All 2x 10 reps.  Began treadmill @ 1.1 mph x 5 mins 

steadily with cues to not walk close to the end, but once cued, he immediately 

corrects.


Total minutes of Exercise: 38


Manual Therapy: NA


HOME EXERCISE PROGRAM: SKTC, Pillow squeezes, bridging, alternate LE lift.  

Patient asks about using OTC tens unit for his back.  Patient was encouraged to 

use it and instructed where to place it.





- Charges


Total Direct Minutes: 38


Total Treatment Time: 53


Procedures billed for this date of service:: hp, ex3


Assessment: Patient presents with no reports of pain today.  He is preparing to 

get his garden started and tending to chickens with improved ease. He demo's 

improved flexibility bilaterally and mentions several times that the exercises 

are much easier to perform.  Progressing with goals well and now beginning 

treadmill.


Patient Education: Education of diagnosis, Body/Joint mechanics, Home Exercise 

Program, Home Safety, Activity Modification, Education of Plan of Care


Patient demonstrates compliance with HEP?: Yes





Short Term Goals


Goal #1: Patient is independent with basic HEP.


Goal to be met by: 01/27/17


Progress towards Goal:: Met


Goal #2: BLE strength 4+/5 throughout to improve stability.


Goal to be met by: 01/27/17


Progress towards Goal:: Met


Goal #3: Patient reports being able to stand/walk up to 10 minutes w/o pain.


Goal to be met by: 01/27/17


Progress towards Goal:: Progressing





Long Term Goals


Goal #1: Patient is able to walk up to 30 minutes w/o pain.


Goal to be met by: 02/10/17


Goal #2: Lumbar ROM WFLs


Goal to be met by: 02/10/17


Progress towards goal: Progressing


Goal #3: Eliminate pain with daily activities.


Goal to be met by: 02/10/17


Progress towards goal: Progressing


Goal #4: Independent with HEP for DC.


Goal to be met by: 02/10/17





Plan


PLAN OF CARE EXPIRES ON:: 02/10/17


ORDER # VISITS AND/OR THROUGH DATE: 02/10/2017


PLAN: Progress Exercises

## 2022-12-01 NOTE — RS.OPPTDN
December 1, 2022     Kacey Randle DO  [address]  [address] [facility]  Professor [address]    Patient: Esther Davies   YOB: 1949   Date of Visit: 12/1/2022       Dear Dr Chaparrita Shah: Thank you for referring Ryder Anderson to me for evaluation  Below are my notes for this consultation  If you have questions, please do not hesitate to call me  I look forward to following your patient along with you  Sincerely,        Brenda Gandhi MD        CC: MD John Weinstein MD Concetta Keeler, MD  12/1/2022 12:07 PM  Sign when Signing Visit  RENAL FOLLOW UP NOTE: td    • ASSESSMENT AND PLAN:  1  CKD stage 3    • Etiology: Diabetic nephropathy/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 SHAY in the past  All serologies were negative including multiple myeloma evaluation  • Baseline creatinine: 1 2-1 62  • Current creatinine: 1 45 at baseline   • Urine protein creatinine ratio: 0 40 g at goal  Recommendations:   • Treat hypertension-please see below   • Treat dyslipidemia-please see below   • Maintain proteinuria less than 1 g or as low as possible   • Avoid nephrotoxic agents such as NSAIDs, patient counseled as such  2  Volume: Euvolemic   3  Hypertension:     Current blood pressure averages:   A m :  119/68, standing 106/63  P m :  119/67, standing 105/65  Heart rate:  60-80 range    • Goal blood pressure: Less than 125/80 given CAD  Recommendations:   • Push nonmedical regimen including weight loss, isotonic exercise and avoidance of salt; patient counseled as such   • Medication changes today:   • Low normal as some mild orthostasis I will decrease a Edarbi to 40 mg just at dinner time  • Recheck blood pressures in about 4 weeks to consider decreasing amlodipine depending upon the readings  4  Electrolytes: All acceptable   5   Mineral bone disorder:   • Calcium/magnesium/phosphorus: All acceptable , magnesium 1 8 on supplement  • PTH intact: 46 which is normal, Subjective


Date of Note: 01/23/17


Visit #: 4


Date of Evaluation: 01/12/17


Treatment Diagnosis: Core weakness


Current Subjective/complaints:: Patient says he believes his back is feeling 

better and exercises are helping his legs.  He says he will start stretching at 

home.


*Precautions: No lifting > 14# and no twisting.





Pain Assessment





- Pain Description


Pain Location: Bilateral sciatic notches


Pain Description: Aching


Pain Description: Intermittent and just when standing.


Current Pain Intensity: "less than last time."





- Heat/Cryotherapy


Treatment: Hot Pack (20 mins mid to low back in supine)





Interventions





- Exercise/Activities/Manual Therapy


Exercises/Activities: Patient received passive stretching of SKTC, HS, 

Piriformis, Trunk rotation (short range), heel cords, and Fig 4.  All x 4 reps 

bilaterally.  Patient continues with trunk stability and general LE 

strengthening of QS, Pillow squeezes, isometric hip flexion and abd, bridging, 

SLR,  all increased to 2 x 10.  Sitting:  Scap retraction with red tband x 10.  

Gave patient SKTC, bridging and pillow squeezes for home.


Total minutes of Exercise: 32


Manual Therapy: NA





- Charges


Total Direct Minutes: 32


Total Treatment Time: 52


Procedures billed for this date of service:: ex2, hp


Assessment: Patient acknowledges continued need for therex in the department 

and that progress and improvement may take time and consistentcy with HEP.  He 

admits feeling better after treatment today and denies unsteadiness or falls.  

He presents with positive attitude toward progression of therex.


Patient Education: Education of diagnosis, Body/Joint mechanics, Home Exercise 

Program, Home Safety, Activity Modification, Education of Plan of Care


Patient demonstrates compliance with HEP?: Yes





Short Term Goals


Goal #1: Patient is independent with basic HEP.


Goal to be met by: 01/27/17


Progress towards Goal:: Progressing


Goal #2: BLE strength 4+/5 throughout to improve stability.


Goal to be met by: 01/27/17


Progress towards Goal:: Progressing


Goal #3: Patient reports being able to stand/walk up to 10 minutes w/o pain.


Goal to be met by: 01/27/17


Progress towards Goal:: Progressing





Long Term Goals


Goal #1: Patient is able to walk up to 30 minutes w/o pain.


Goal to be met by: 02/10/17


Goal #2: Lumbar ROM WFLs


Goal to be met by: 02/10/17


Progress towards goal: Progressing


Goal #3: Eliminate pain with daily activities.


Goal to be met by: 02/10/17


Progress towards goal: Progressing


Goal #4: Independent with HEP for DC.


Goal to be met by: 02/10/17





Plan


PLAN OF CARE EXPIRES ON:: 02/10/17


ORDER # VISITS AND/OR THROUGH DATE: 02/10/2017


PLAN: Progress Exercises from last visit   • Vitamin-D:Currently on supplements  41 0 as of 10/28/2021 at goal  6  Dyslipidemia:   • Goal LDL: Less than 70 given CAD   • Current lipid profile: LDL 43/HDL 43/triglycerides 143  Recommendations: At goal so no changes   7  Anemia: normal hemoglobin at 13 5  8  Other problems:   • Diabetes mellitus per your discretion   • CAD followed by Dr Anneliese Gotti through [facility]  Status post MI last October involving to cardiac stents  : Recent emesis of LAD lesion with repeat PTCA drug-eluting stent 2018  Circumflex artery and right coronary artery 50% stenosis  Ejection fraction 55%  • Sarcoidosis followed by Zac Aparicio   • Kyphoscoliosis   • Cirrhosis of the liver   • Gout   • Ankylosing spondylitis        GI health maintenance: Last colonoscopy: APPROXIMATELY 3-4 YEARS AGO, 5 years follow-up so approximately 1-2 years per Dr Lucy Ahuja:    Patient Instructions   1  Medication changes today:  • Please decrease Edarbi to just 40 mg after dinner stop the morning dose as your blood pressure is actually slightly low        2   Please take 1 week a blood pressure readings  4 weeks after making the above medication change     AS FOLLOWS  MORNING AND EVENING, SITTING AND STANDING as follows:  · TAKE THE MORNING READINGS BEFORE ANY MEDICATIONS AND WHEN YOU ARE RELAXED FOR SEVERAL MINUTES  · TAKE THE EVENING READINGS:  BETWEEN 7-10 P M ; PRIOR TO ANY MEDICATIONS; AT LEAST IN OUR  FROM DINNER; AND CERTAINLY AFTER RELAXING FOR A FEW MINUTES  · PLEASE INCLUDE HEART RATE WITH YOUR BLOOD PRESSURE READINGS  · When taking standing readings, keep your arm supported at heart level and not dangling  · Make sure you are sitting with your back supported and feet on the ground and do not cross your legs or feet  · Make sure you have not taken any coffee or caffeine products or exercised or smoke cigarettes at least 30 minutes before taking your blood pressure  Then please mail these readings into the office    3  In 3 months:  • Please go for nonfasting lab work but in the morning  • Please take 1 week a blood pressure readings as outlined above and mail those into the office       4  Follow-up in 6  months  • Please bring in 1 week a blood pressure readings morning evening, sitting and standing is outlined above    • Please go for fasting lab work 1-2 weeks prior to your appointment      5  General instructions:  • AVOID SALT BUT NOT ADDING AN READING LABELS TO MAKE SURE THERE IS LOW-SALT IN THE FOOD THAT YOU ARE EATING  o Goal is less than 2 g of sodium intake or less than 5 g of sodium chloride intake per day    • Avoid nonsteroidal anti-inflammatory drugs such as Naprosyn, ibuprofen, Aleve, Advil, Celebrex, Meloxicam (Mobic) etc   You can use Tylenol as needed if you do not have any liver condition to be concerned about    • Avoid medications such as Sudafed or decongestants and antihistamines that contained the D component which is the decongestant  You can take antihistamines without the decongestant or D component  • Try to avoid medications such as pantoprazole or  Protonix/Nexium or Esomeprazole)/Prilosec or omeprazole/Prevacid or lansoprazole/AcipHex or Rabeprazole  If you are able to, use Pepcid as this is safer for your kidneys  • Try to exercise at least 30 minutes 3 days a week to begin with with an ultimate goal of 5 days a week for at least 30 minutes    • Please do not drink more than 2 glasses of alcohol/wine on a daily basis as this may contribute to your high blood pressure  Subjective: There has been no hospitalizations or acute illnesses since last visit  The patient is having nausea vomiting diarrhea over the last few days with some abdominal mild cramping but no significant pain  Symptoms are improving seen by family physician placed on Zofran and Nexium  No fevers, chills, or cough or colds    Good appetite and slightly decreased energy with the GI symptoms  No hematuria, dysuria, voiding symptoms or foamy urine  No chest pain, chronic dyspnea on exertion with a diagnosis of COPD/and fibrosis and sarcoidosis patient is followed by Dr Zhanna Chapin  No headaches, dizziness or lightheadedness  Blood pressure medications/renal pertinent medications:  • Metoprolol tartrate 50 mg the morning and 25 mg the evening  • Slow-Mag 2 twice a day  • Chlorthalidone 12 5 mg daily in the morning  • Amlodipine 5 mg daily in the morning  • Edarbi 40 mg twice a day  • Atorvastatin 80 mg daily      ROS:  See HPI, otherwise review of systems as completely reviewed with the patient are negative    Past Medical History:   Diagnosis Date   • Anemia    • Arthritis    • Gee's esophagus    • Breast lump    • Cancer (HCC)    • Chronic kidney disease    • Cirrhosis ([facility] [address] )    • Coronary artery disease     cardiac cath; stents    • [facility]    • H/O heart artery stent    • Hypertension    • Inguinal hernia     Right   • Sarcoidosis    • Sarcoidosis of lung [facility] )    • Skin cancer    • SOB (shortness of breath)    • Upper respiratory infection, viral 5/31/2022     Past Surgical History:   Procedure Laterality Date   • CARDIAC CATHETERIZATION     • CATARACT EXTRACTION, BILATERAL      left eye 05/08 and right 5/22/2022   • CERVICAL FUSION     • CHOLECYSTECTOMY     • COLONOSCOPY  04/11/2016   • EGD AND COLONOSCOPY  06/24/2019   • ERCP  09/11/2014    transab esop hiat griffin rpr   • ESOPHAGOGASTRIC FUNDOPLASTY     • HERNIA REPAIR Right     p I/griffin init reduc >5 yr   • HIP SURGERY Right     Replacement   • KIDNEY STONE SURGERY      Fragmenting   • LIVER BIOPSY     • MULTIPLE TOOTH EXTRACTIONS      "extraction of all maxillary teeth"   • SKIN BIOPSY  05/2020   • THUMB FUSION      with graft   • TONSILLECTOMY     • US GUIDANCE  10/22/2014     Family History   Problem Relation Age of Onset   • Hypertension Mother    • Atrial fibrillation Mother    • Heart disease Mother    • Kidney disease Father    • COPD Father    • Heart disease Father    • Asthma Neg Hx    • Lung cancer Neg Hx       reports that he has never smoked  He has never used smokeless tobacco  He reports that he does not drink alcohol and does not use drugs  I COMPLETELY REVIEWED THE PAST MEDICAL HISTORY/PAST SURGICAL HISTORY/SOCIAL HISTORY/FAMILY HISTORY/AND MEDICATIONS  AND UPDATED ALL    Objective:     Vitals:   BP sitting on right:  118/54 with a heart rate of 64 regular, same on left  BP standing on left:  120/68 with a heart rate of 80 and regular  Vitals:    12/01/22 1124   BP: 126/60   Pulse: 67       Weight (last 2 days)     Date/Time Weight    12/01/22 1124 85 (187 4)        Wt Readings from Last 3 Encounters:   12/01/22 85 kg (187 lb 6 4 oz)   11/30/22 84 8 kg (187 lb)   11/15/22 85 7 kg (189 lb)       Body mass index is 26 89 kg/m²      Physical Exam: General:  No acute distress  Skin:  No acute rash  Eyes:  No scleral icterus, noninjected, no discharge from eyes  ENT:  Moist mucous membranes  Neck:  Supple, no jugular venous distention, trachea is midline, no lymphadenopathy and no thyromegaly  Back   No CVAT  Chest:  Clear to auscultation and percussion, good respiratory effort  CVS:  Regular rate and rhythm without a rub, or gallops or murmurs  Abdomen:  Soft and nontender with normal bowel sounds  Extremities:  No cyanosis and no edema, no arthritic changes, normal range of motion  Neuro:  Grossly intact  Psych:  Alert, oriented x3 and appropriate      Medications:    Current Outpatient Medications:   •  albuterol (PROVENTIL HFA,VENTOLIN HFA) 90 mcg/act inhaler, Inhale 2 puffs every 6 (six) hours as needed for wheezing or shortness of breath, Disp: 6 7 g, Rfl: 2  •  allopurinol (ZYLOPRIM) 300 mg tablet, "ONE-HALF" TABLET EVERY DAY, Disp: 45 tablet, Rfl: 3  •  amLODIPine (NORVASC) 5 mg tablet, TAKE 1 TABLET (5 MG) BY MOUTH DAILY, Disp: 90 tablet, Rfl: 5  •  aspirin (ECOTRIN LOW STRENGTH) 81 mg EC tablet, Take 81 mg by mouth daily, Disp: , Rfl:   •  atorvastatin (LIPITOR) 80 mg tablet, TAKE 1 TABLET (80 MG TOTAL) BY MOUTH DAILY, Disp: 90 tablet, Rfl: 3  •  azilsartan medoxomil (Edarbi) 40 MG tablet, Take 1 tablet (40 mg total) by mouth 2 (two) times a day (Patient taking differently: Take 40 mg by mouth daily after dinner), Disp: 180 tablet, Rfl: 3  •  B Complex-C (SUPERPLEX-T) TABS, Take 1 tablet by mouth daily, Disp: , Rfl:   •  Breo Ellipta 100-25 MCG/INH inhaler, Inhale 1 puff daily Rinse mouth after use , Disp: 60 blister, Rfl: 2  •  chlorthalidone 25 mg tablet, TAKE "ONE-HALF" TABLET (12 5 MG) BY MOUTH DAILY, Disp: 45 tablet, Rfl: 4  •  Cholecalciferol (VITAMIN D3) 2000 units TABS, Take 1 tablet by mouth daily, Disp: , Rfl:   •  co-enzyme Q-10 50 MG capsule, Take 100 mg by mouth in the morning , Disp: , Rfl:   •  colchicine (COLCRYS) 0 6 mg tablet, TAKE 1 TABLET (0 6 MG TOTAL) BY MOUTH DAILY, Disp: 30 tablet, Rfl: 3  •  esomeprazole (NexIUM) 40 MG capsule, Take 40 mg by mouth every morning before breakfast, Disp: , Rfl:   •  Ferrous Sulfate (IRON) 325 (65 Fe) MG TABS, Take 1 tablet by mouth daily, Disp: , Rfl:   •  Incruse Ellipta 62 5 MCG/INH AEPB inhaler, Inhale 1 puff daily, Disp: 30 blister, Rfl: 1  •  Januvia 100 MG tablet, TAKE ONE TABLET DAILY, Disp: 90 tablet, Rfl: 0  •  loperamide (IMODIUM) 2 mg capsule, Take 1 capsule (2 mg total) by mouth 4 (four) times a day as needed for diarrhea, Disp: 30 capsule, Rfl: 0  •  magnesium chloride-calcium (SLOW-MAG) 71 5-119 mg, Take 2 tablets by mouth 2 (two) times a day, Disp: , Rfl:   •  metoprolol tartrate (LOPRESSOR) 50 mg tablet, TAKE 1 TABLET (50 MG TOTAL) BY MOUTH IN THE MORNING AND "ONE-HALF" TABLET (25MG) IN THE EVENING, Disp: 135 tablet, Rfl: 3  •  ondansetron (ZOFRAN) 4 mg tablet, Take 1 tablet (4 mg total) by mouth every 8 (eight) hours as needed for nausea or vomiting, Disp: 20 tablet, Rfl: 0  •  pantoprazole (PROTONIX) 40 mg tablet, TAKE 1 TABLET DAILY HALF HOUR BEFORE EATING BREAKFAST (Patient not taking: Reported on 12/1/2022), Disp: 90 tablet, Rfl: 3  •  prasugrel (EFFIENT) tablet, Take 10 mg by mouth daily (Patient not taking: Reported on 12/1/2022), Disp: , Rfl:     Lab, Imaging and other studies: I have personally reviewed pertinent labs    Laboratory Results:  Results for orders placed or performed in visit on 38/40/14   Basic metabolic panel   Result Value Ref Range    Sodium 139 135 - 147 mmol/L    Potassium 4 7 3 5 - 5 3 mmol/L    Chloride 104 96 - 108 mmol/L    CO2 29 21 - 32 mmol/L    ANION GAP 6 4 - 13 mmol/L    BUN 26 (H) 5 - 25 mg/dL    Creatinine 1 45 (H) 0 60 - 1 30 mg/dL    Glucose 145 (H) 65 - 140 mg/dL    Calcium 9 4 8 4 - 10 2 mg/dL    eGFR 47 ml/min/1 73sq m   CBC and differential   Result Value Ref Range    WBC 9 07 4 31 - 10 16 Thousand/uL    RBC 4 10 3 88 - 5 62 Million/uL    Hemoglobin 13 5 12 0 - 17 0 g/dL    Hematocrit 39 7 36 5 - 49 3 %    MCV 97 82 - 98 fL    MCH 32 9 26 8 - 34 3 pg    MCHC 34 0 31 4 - 37 4 g/dL    RDW 13 3 11 6 - 15 1 %    MPV 9 7 8 9 - 12 7 fL    Platelets 223 707 - 759 Thousands/uL    nRBC 0 /100 WBCs    Neutrophils Relative 67 43 - 75 %    Immat GRANS % 0 0 - 2 %    Lymphocytes Relative 19 14 - 44 %    Monocytes Relative 10 4 - 12 %    Eosinophils Relative 4 0 - 6 %    Basophils Relative 0 0 - 1 %    Neutrophils Absolute 5 97 1 85 - 7 62 Thousands/µL    Immature Grans Absolute 0 04 0 00 - 0 20 Thousand/uL    Lymphocytes Absolute 1 75 0 60 - 4 47 Thousands/µL    Monocytes Absolute 0 87 0 17 - 1 22 Thousand/µL    Eosinophils Absolute 0 40 0 00 - 0 61 Thousand/µL    Basophils Absolute 0 04 0 00 - 0 10 Thousands/µL   Magnesium   Result Value Ref Range    Magnesium 1 8 (L) 1 9 - 2 7 mg/dL   Protein / creatinine ratio, urine   Result Value Ref Range    Creatinine, Ur 127 0 mg/dL    Protein Urine Random 51 mg/dL    Prot/Creat Ratio, Ur 0 40 (H) 0 00 - 0 10             Invalid input(s): ALBUMIN      Radiology review:   chest X-ray    Ultrasound      Portions of the record may have been created with voice recognition software  Occasional wrong word or "sound a like" substitutions may have occurred due to the inherent limitations of voice recognition software  Read the chart carefully and recognize, using context, where substitutions have occurred